# Patient Record
Sex: FEMALE | Race: WHITE
[De-identification: names, ages, dates, MRNs, and addresses within clinical notes are randomized per-mention and may not be internally consistent; named-entity substitution may affect disease eponyms.]

---

## 2017-06-06 ENCOUNTER — HOSPITAL ENCOUNTER (OUTPATIENT)
Dept: HOSPITAL 62 - SP | Age: 72
End: 2017-06-06
Attending: OPHTHALMOLOGY
Payer: MEDICARE

## 2017-06-06 DIAGNOSIS — G45.3: Primary | ICD-10-CM

## 2017-06-06 PROCEDURE — 93880 EXTRACRANIAL BILAT STUDY: CPT

## 2017-06-06 NOTE — RADIOLOGY REPORT (SQ)
EXAM DESCRIPTION:  CAROTID DOPPLER



COMPLETED DATE/TIME:  6/6/2017 11:53 am



REASON FOR STUDY:  AMAUROSIS FUGAX G45.3  AMAUROSIS FUGAX



COMPARISON:  No prior brain imaging available



TECHNIQUE:  Grayscale ultrasound, Doppler velocity and spectra, and color Doppler images acquired of 
the extra-cranial carotid and vertebral arteries. Images stored on PACS.



LIMITATIONS:  None.



FINDINGS:  RIGHT CAROTID

CCA Velocities: Within normal limits.

ICA Velocities

 Peak systolic 0.60 m/s.

 End diastolic 0.16 m/s.

Proximal ICA/CCA peak systolic ratio 1.5.

Spectra normal. No significant plaque.

LEFT CAROTID

CCA Velocities: Within normal limits.

ICA Velocities

 Peak systolic 0.72 m/s.

 End diastolic 0.14 m/s.

Proximal ICA/CCA peak systolic ratio 1.1.

Spectra normal. No significant plaque.

VERTEBRAL ARTERIES: Antegrade flow.  Normal waveforms.

SUBCLAVIAN ARTERIES: Not evaluated

OTHER: No other significant finding.



IMPRESSION:  NO HEMODYNAMICALLY SIGNIFICANT STENOSIS.



COMMENT:  Quality ID #195:  Velocity criteria are extrapolated from the diameter data as defined by t
he Society of Radiologists in Ultrasound Consensus Conference. Radiology 2003: 229; 340-346.



TECHNICAL DOCUMENTATION:  JOB ID:  [id number]

 2011 [employer]- All Rights Reserved

## 2018-02-15 ENCOUNTER — HOSPITAL ENCOUNTER (OUTPATIENT)
Dept: HOSPITAL 62 - SC | Age: 73
Discharge: HOME | End: 2018-02-15
Attending: OPHTHALMOLOGY
Payer: MEDICARE

## 2018-02-15 DIAGNOSIS — M19.90: ICD-10-CM

## 2018-02-15 DIAGNOSIS — G45.3: ICD-10-CM

## 2018-02-15 DIAGNOSIS — E78.00: ICD-10-CM

## 2018-02-15 DIAGNOSIS — D31.31: ICD-10-CM

## 2018-02-15 DIAGNOSIS — H25.813: Primary | ICD-10-CM

## 2018-02-15 DIAGNOSIS — H43.811: ICD-10-CM

## 2018-02-15 DIAGNOSIS — H01.002: ICD-10-CM

## 2018-02-15 DIAGNOSIS — I10: ICD-10-CM

## 2018-02-15 DIAGNOSIS — H01.005: ICD-10-CM

## 2018-02-15 DIAGNOSIS — H04.123: ICD-10-CM

## 2018-02-15 DIAGNOSIS — Z87.440: ICD-10-CM

## 2018-02-15 DIAGNOSIS — Z79.899: ICD-10-CM

## 2018-02-15 PROCEDURE — 08RK3JZ REPLACEMENT OF LEFT LENS WITH SYNTHETIC SUBSTITUTE, PERCUTANEOUS APPROACH: ICD-10-PCS | Performed by: OPHTHALMOLOGY

## 2018-02-15 PROCEDURE — 66984 XCAPSL CTRC RMVL W/O ECP: CPT

## 2018-02-15 PROCEDURE — V2632 POST CHMBR INTRAOCULAR LENS: HCPCS

## 2018-02-15 RX ADMIN — CYCLOPENTOLATE HYDROCHLORIDE AND PHENYLEPHRINE HYDROCHLORIDE PRN DROP: 2; 10 SOLUTION/ DROPS OPHTHALMIC at 10:13

## 2018-02-15 RX ADMIN — TROPICAMIDE PRN DROP: 10 SOLUTION/ DROPS OPHTHALMIC at 10:23

## 2018-02-15 RX ADMIN — TROPICAMIDE PRN DROP: 10 SOLUTION/ DROPS OPHTHALMIC at 10:06

## 2018-02-15 RX ADMIN — CYCLOPENTOLATE HYDROCHLORIDE AND PHENYLEPHRINE HYDROCHLORIDE PRN DROP: 2; 10 SOLUTION/ DROPS OPHTHALMIC at 10:06

## 2018-02-15 RX ADMIN — TETRACAINE HYDROCHLORIDE PRN DROP: 5 SOLUTION OPHTHALMIC at 10:38

## 2018-02-15 RX ADMIN — BESIFLOXACIN PRN DROP: 6 SUSPENSION OPHTHALMIC at 10:13

## 2018-02-15 RX ADMIN — TETRACAINE HYDROCHLORIDE PRN DROP: 5 SOLUTION OPHTHALMIC at 10:43

## 2018-02-15 RX ADMIN — BESIFLOXACIN PRN DROP: 6 SUSPENSION OPHTHALMIC at 11:03

## 2018-02-15 RX ADMIN — TETRACAINE HYDROCHLORIDE PRN DROP: 5 SOLUTION OPHTHALMIC at 10:06

## 2018-02-15 RX ADMIN — BESIFLOXACIN PRN DROP: 6 SUSPENSION OPHTHALMIC at 10:06

## 2018-02-15 RX ADMIN — CYCLOPENTOLATE HYDROCHLORIDE AND PHENYLEPHRINE HYDROCHLORIDE PRN DROP: 2; 10 SOLUTION/ DROPS OPHTHALMIC at 10:23

## 2018-02-15 RX ADMIN — TROPICAMIDE PRN DROP: 10 SOLUTION/ DROPS OPHTHALMIC at 10:13

## 2018-02-15 NOTE — [facility] OPERATIVE REPORT E
[facility] Operative Report



NAME: YAYA ALLEN

MRN: [id number]

AGE: 72Y

DATE OF SURGERY: 02/15/2018



PREOPERATIVE DIAGNOSIS:

CATARACT, LEFT EYE.



POSTOPERATIVE DIAGNOSIS:

CATARACT, LEFT EYE.



OPERATION:

Cataract extraction with intraocular lens implant of the left eye.



SURGEON:

SARA CALL M.D.



ANESTHESIA:

Topical.



PROCEDURE:

After obtaining appropriate consent, the patient's left eye was prepped and

draped in sterile fashion as well as the surgeon in a sterile manner, and

cataract surgery was started.  First a paracentesis blade was used to make

a small side-port incision.  Viscoelastic was used to inflate the anterior

chamber.  Next a 2.4 mm incision was made with the paracentesis blade.  A

continuous capsulorrhexis incision was made using a cystotome and Utrata

forceps.  Following this hydrodissection was carried out to make the lens

fully loose and mobile and it was rotated 90 degrees.  Following this, a

divide-and-conquer technique was used to phacoemulsify the lens with a CDE

of 5.23. The remaining cortex was removed with irrigation/aspiration. 

Provisc was instilled into the capsular bag to inflate the bag.  A SN60WF,

12.5 diopter lens was placed.  The remaining viscoelastic material was

removed with irrigation/aspiration.  Following this, a 10-0 nylon suture

was used to close the incision and it was found to be watertight.  Vigamox

was instilled in the eye and a protective shield was placed over the eye. 

The patient returned to the postoperative recovery in stable condition.





DICTATING PHYSICIAN: SARA CALL M.D.



5139M              DT: 02/15/2018 2227

PHY#: 2011         DD: 02/15/2018 1913

ID:   [id number]               JOB#: [id number]       ACCT: [id number]



cc:SARA CALL M.D.

>

## 2018-02-16 NOTE — DISCHARGE SUMMARY E
Discharge Summary



NAME: YAYA ALLEN

MRN:  [id number]        : 1945     AGE: 72Y

ADMITTED: 02/15/2018                  DISCHARGED: 02/15/2018



This is a 72-year-old patient who underwent cataract extraction of her

left eye.



DIAGNOSIS:  Cataract, left eye.



She underwent surgery because she was having difficulty driving at night

secondary to glare from headlights.



She should be on a regular diet.  No bending at the waist.  No heavy

lifting.  She should use her Besivance, Ilevro, and Durezol at 2:00 p.m.

and 8:00 p.m. and sleep with a rigid shield.  I will see her for her 1-day

postoperative tomorrow.







DICTATING PHYSICIAN:  SARA CALL M.D.





5139M                  DT: 02/15/2018    2244

PHY#: 2011            DD: 02/15/2018    1913

ID:   [id number]           JOB#: [id number]       ACCT: [id number]



cc:SARA CALL M.D.

>

## 2018-03-08 ENCOUNTER — HOSPITAL ENCOUNTER (OUTPATIENT)
Dept: HOSPITAL 62 - SC | Age: 73
Discharge: HOME | End: 2018-03-08
Attending: OPHTHALMOLOGY
Payer: MEDICARE

## 2018-03-08 DIAGNOSIS — H25.811: Primary | ICD-10-CM

## 2018-03-08 DIAGNOSIS — Z96.1: ICD-10-CM

## 2018-03-08 DIAGNOSIS — K21.9: ICD-10-CM

## 2018-03-08 DIAGNOSIS — E66.9: ICD-10-CM

## 2018-03-08 DIAGNOSIS — Z79.899: ICD-10-CM

## 2018-03-08 DIAGNOSIS — I10: ICD-10-CM

## 2018-03-08 DIAGNOSIS — Z79.51: ICD-10-CM

## 2018-03-08 PROCEDURE — 08RJ3JZ REPLACEMENT OF RIGHT LENS WITH SYNTHETIC SUBSTITUTE, PERCUTANEOUS APPROACH: ICD-10-PCS | Performed by: OPHTHALMOLOGY

## 2018-03-08 PROCEDURE — 66984 XCAPSL CTRC RMVL W/O ECP: CPT

## 2018-03-08 PROCEDURE — V2632 POST CHMBR INTRAOCULAR LENS: HCPCS

## 2018-03-08 RX ADMIN — CYCLOPENTOLATE HYDROCHLORIDE AND PHENYLEPHRINE HYDROCHLORIDE PRN DROP: 2; 10 SOLUTION/ DROPS OPHTHALMIC at 09:54

## 2018-03-08 RX ADMIN — CYCLOPENTOLATE HYDROCHLORIDE AND PHENYLEPHRINE HYDROCHLORIDE PRN DROP: 2; 10 SOLUTION/ DROPS OPHTHALMIC at 10:04

## 2018-03-08 RX ADMIN — CYCLOPENTOLATE HYDROCHLORIDE AND PHENYLEPHRINE HYDROCHLORIDE PRN DROP: 2; 10 SOLUTION/ DROPS OPHTHALMIC at 10:13

## 2018-03-08 RX ADMIN — TROPICAMIDE PRN DROP: 10 SOLUTION/ DROPS OPHTHALMIC at 09:54

## 2018-03-08 RX ADMIN — TETRACAINE HYDROCHLORIDE PRN DROP: 5 SOLUTION OPHTHALMIC at 09:53

## 2018-03-08 RX ADMIN — TETRACAINE HYDROCHLORIDE PRN DROP: 5 SOLUTION OPHTHALMIC at 10:28

## 2018-03-08 RX ADMIN — TETRACAINE HYDROCHLORIDE PRN DROP: 5 SOLUTION OPHTHALMIC at 10:18

## 2018-03-08 RX ADMIN — BESIFLOXACIN PRN DROP: 6 SUSPENSION OPHTHALMIC at 10:14

## 2018-03-08 RX ADMIN — TROPICAMIDE PRN DROP: 10 SOLUTION/ DROPS OPHTHALMIC at 10:13

## 2018-03-08 RX ADMIN — BESIFLOXACIN PRN DROP: 6 SUSPENSION OPHTHALMIC at 10:50

## 2018-03-08 RX ADMIN — BESIFLOXACIN PRN DROP: 6 SUSPENSION OPHTHALMIC at 09:55

## 2018-03-08 RX ADMIN — TROPICAMIDE PRN DROP: 10 SOLUTION/ DROPS OPHTHALMIC at 10:04

## 2018-03-08 NOTE — [facility] OPERATIVE REPORT E
[facility] Operative Report



NAME: YAYA ALLEN

                                      MRN: [id number]

                             AGE: 72Y

DATE OF SURGERY:  03/08/2018        ROOM:



PREOPERATIVE DIAGNOSIS:

CATARACT, RIGHT EYE.



POSTOPERATIVE DIAGNOSIS:

CATARACT, RIGHT EYE.



OPERATION:

Cataract extraction with intraocular lens implant of the right eye.



SURGEON:

SARA CALL M.D.



ANESTHESIA:

Topical.



PROCEDURE:

After obtaining appropriate consent, the patient's right eye was prepped

and draped in sterile fashion as well as the surgeon in a sterile manner

and cataract surgery was started.  First a paracentesis blade was used to

make a small side-port incision.  Viscoelastic was used to inflate the

anterior chamber.  Next a 2.4 mm incision was made with the paracentesis

blade.  A continuous capsulorrhexis incision was made using a cystotome and

Utrata forceps.  Following this hydrodissection was carried out to make the

lens fully loose and mobile and it was rotated 90 degrees.  Following this,

a divide-and-conquer technique was used to phacoemulsify the lens with a

CDE of 5.84. The remaining cortex was removed with irrigation/aspiration. 

Provisc was instilled into the capsular bag to inflate the bag.  A SN60WF,

15.0 diopter lens was placed.  The remaining viscoelastic material was

removed with irrigation/aspiration.  Following this, a 10-0 nylon suture

was used to close the incision and it was found to be watertight.  Vigamox

was instilled in the eye and a protective shield was placed over the eye. 

The patient returned to the postoperative recovery in stable condition.







DICTATING PHYSICIAN: SARA CALL M.D.



5090M              DT: 03/08/2018 2012

PHY#: 2011         DD: 03/08/2018 2010

ID:   [id number]               JOB#: [id number]       ACCT: [id number]



cc:SARA CALL M.D.

>

## 2018-03-08 NOTE — DISCHARGE SUMMARY E
Discharge Summary



NAME: YAYA ALLEN

MRN:  [id number]        : 1945     AGE: 72Y

ADMITTED: 2018                  DISCHARGED: 2018



HOSPITAL COURSE:

This is a 72-year-old-old female who underwent cataract extraction of the

right eye.



DIAGNOSIS:

Cataract, right eye.



She underwent surgery because she was having difficulty reading small

print like newspaper.



DISCHARGE INSTRUCTIONS:

She is to be on a regular diet.  No bending at her waist, no heavy

lifting.   She is to use her Besivance, Ilevro, and Durezol at 3:00 p.m.

and 8:00 p.m., and sleep with a rigid shield.  I will see her for her 1

day postoperative tomorrow.









DICTATING PHYSICIAN:  SARA CALL M.D.





5090M                  DT: 2018

PHY#: 2011            DD: 2018

ID:   [id number]           JOB#: [id number]      ACCT: [id number]



cc:SARA CALL M.D.

>

## 2019-09-03 ENCOUNTER — HOSPITAL ENCOUNTER (OUTPATIENT)
Dept: HOSPITAL 62 - WI | Age: 74
End: 2019-09-03
Attending: NURSE PRACTITIONER
Payer: MEDICARE

## 2019-09-03 DIAGNOSIS — M85.88: ICD-10-CM

## 2019-09-03 DIAGNOSIS — Z12.31: Primary | ICD-10-CM

## 2019-09-03 DIAGNOSIS — N95.8: ICD-10-CM

## 2019-09-03 PROCEDURE — 77080 DXA BONE DENSITY AXIAL: CPT

## 2019-09-03 PROCEDURE — 77067 SCR MAMMO BI INCL CAD: CPT

## 2019-09-03 NOTE — WOMENS IMAGING REPORT
EXAM DESCRIPTION:  BONE DENSITY HIP/SPINE



COMPLETED DATE/TIME:  9/3/2019 2:23 pm



REASON FOR STUDY:  N95.8 OTHER MENOPAUSAL DISORDER Z12.31  ENCNTR SCREEN MAMMOGRAM FOR MALIGNANT NEOP
LASM OF DIANA N95.8  OTHER SPECIFIED MENOPAUSAL AND PERIMENOPAUSAL DISORDER



COMPARISON:   2003



TECHNIQUE:  Dual-Energy X-ray Absorptiometry (DEXA) of the AP Spine and Hip.



LIMITATIONS:  None.



FINDINGS:  LUMBAR SPINE:

The bone mineral density (BMD) measured from L1-L4 in the AP projection correlates with a T-score of 
+0.1, which is normal as defined by the World Health Organization.

BMD Change vs Baseline:  N/A

HIP:

The bone mineral density (BMD) measured in the left total hip correlates with a T-score of -1.1, whic
h is osteopenic as defined by the World Health Organization.

BMD Change vs Baseline:  This represents a 23% decrease in bone density since 2003

10 year Fracture Risk Assessment:

Major Osteoporotic Fracture:  12%

Hip Fracture:  3.2%



IMPRESSION:  1. LUMBAR SPINE WHO CLASSIFICATION: Normal

2. HIP WHO CLASSIFICATION: Osteopenic

OVERALL ASSESSMENT:

WHO CLASSIFICATION:  Osteopenic



COMMENT:  The World Health Organization defines low BMD as follows:

T-score:

Normal:  Greater than -1.0

Osteopenia: Between -1.0 and -2.5

Osteoporosis:  Less than -2.5 without fractures

Established osteoporosis:  Less than -2.5 with fractures

In general, you may wish to consider:

Diagnosis          Treatment                     Follow-up DEXA

Normal BMD      Prevention                    2-3 years

Osteopenia       Prevention/Therapy        1-2 years

Osteoporosis     Therapy                        Yearly



TECHNICAL DOCUMENTATION:  JOB ID:  [id number]

 2011 [employer]- All Rights Reserved



Reading location - IP/workstation name: [email or web address]

## 2019-09-03 NOTE — WOMENS IMAGING REPORT
EXAM DESCRIPTION:  BILAT SCREENING MAMMO W/CAD



COMPLETED DATE/TIME:  9/3/2019 2:23 pm



REASON FOR STUDY:  12.31 SCREENING MAMMO Z12.31  ENCNTR SCREEN MAMMOGRAM FOR MALIGNANT NEOPLASM OF BR
E N95.8  OTHER SPECIFIED MENOPAUSAL AND PERIMENOPAUSAL DISORDER



COMPARISON:  2010



EXAM PARAMETERS:  Standard craniocaudal and mediolateral oblique views of each breast recorded using 
digital acquisition.

Read with the assistance of CAD.

.[facility] [employer]  Version 9.2



LIMITATIONS:  None.



FINDINGS:  No suspicious masses, suspicious calcifications or architectural distortion. No areas of c
oncern.



IMPRESSION:  Negative MAMMOGRAM.  BIRADS 1



BREAST DENSITY:  a. The breasts are almost entirely fatty.



BIRAD:  ASSESSMENT:  1 NEGATIVE



RECOMMENDATION:  ROUTINE SCREENING



COMMENT:  The patient has been notified of the results by letter per MQSA requirements. Additional no
tification policies are in place for contacting patient with suspicious or incomplete findings.

Quality ID #225: The American College of Radiology recommends an annual screening mammogram for women
 aged 40 years or over. This facility utilizes a reminder system to ensure that all patients receive 
reminder letters, and/or direct phone calls for appointments. This includes reminders for routine scr
eening mammograms, diagnostic mammograms, or other Breast Imaging Interventions when appropriate.  Th
is patient will be placed in the appropriate reminder system.



TECHNICAL DOCUMENTATION:  FINDING NUMBER: (1)

ASSESSMENT: (1)

JOB ID:  [id number]

 2011 [employer]- All Rights Reserved



Reading location - IP/workstation name: [email or web address]

## 2020-07-13 ENCOUNTER — HOSPITAL ENCOUNTER (OUTPATIENT)
Dept: HOSPITAL 62 - RAD | Age: 75
End: 2020-07-13
Attending: INTERNAL MEDICINE
Payer: MEDICARE

## 2020-07-13 DIAGNOSIS — E78.00: ICD-10-CM

## 2020-07-13 DIAGNOSIS — R07.9: Primary | ICD-10-CM

## 2020-07-13 DIAGNOSIS — I10: ICD-10-CM

## 2020-07-13 DIAGNOSIS — R01.1: ICD-10-CM

## 2020-07-13 DIAGNOSIS — R06.02: ICD-10-CM

## 2020-07-13 PROCEDURE — 78452 HT MUSCLE IMAGE SPECT MULT: CPT

## 2020-07-13 PROCEDURE — 93306 TTE W/DOPPLER COMPLETE: CPT

## 2020-07-13 PROCEDURE — A9500 TC99M SESTAMIBI: HCPCS

## 2020-07-13 PROCEDURE — 93017 CV STRESS TEST TRACING ONLY: CPT

## 2020-07-13 NOTE — XCELERA REPORT
[address] [address]

                               Tel: [phone]

                               Fax: [phone]



                      Transthoracic Echocardiogram Report

_______________________________________________________________________________



Name: YAYA ALLEN

MRN: [id number]                                Age: 75 yrs

Gender: Female                                 : 1945

Patient Status: Outpatient                     Patient Location: RAD

Account #: [id number]

Study Date: 2020 11:30 AM

History: Dyspnea

Accession #: [id number]

_______________________________________________________________________________



Height: 62 in        Weight: 232 lb        BSA: 2.0 m2

_______________________________________________________________________________

Procedure: A complete two-dimensional transthoracic echocardiogram was

performed (2D, M-mode, spectral and color flow Doppler). The study was

technically difficult with many images being suboptimal in quality.

Reason For Study: SOB CP

Previous Evaluation: No previous studies were available.

History: Shortness of breath. Chest pain.



Ordering Physician: JUAN SMITH

Performed By: Rashmi Armstrong

_______________________________________________________________________________



Interpretation Summary

Left ventricular systolic function is normal.

The Ejection Fraction estimate is 60-65%

The right ventricle is normal in size and function.

There is a mild amount of mitral regurgitation

There is mild aortic stenosis

There is a trace amount of aortic regurgitation

There is no pericardial effusion.



MMode/2D Measurements & Calculations



RVDd: 3.1 cm        LVIDd: 4.3 cm FS: 38.0 %           Ao root diam: 2.4 cm

IVSd: 1.3 cm        LVIDs: 2.7 cm EDV(Teich): 83.1 ml  Ao root area: 4.6 cm2

                    LVPWd: 1.3 cm ESV(Teich): 26.2 ml  LA dimension: 4.5 cm

                                  EF(Teich): 68.5 %

        _______________________________________________________________

LVOT diam: 1.8 cm



LVOT area: 2.6 cm2



Doppler Measurements & Calculations

MV E max claudia:      MV P1/2t max claudia:     Ao V2 max:        LV V1 max P.4 cm/sec       142.4 cm/sec          221.5 cm/sec      10.2 mmHg

MV A max claudia:      MV P1/2t: 96.8 msec   Ao max PG:        LV V1 mean P.0 cm/sec       MVA(P1/2t): 2.3 cm2   19.6 mmHg         6.2 mmHg

MV E/A: 1.00       MV dec slope:         Ao V2 mean:       LV V1 max:

                                         136.9 cm/sec      159.6 cm/sec

                   431.0 cm/sec2         Ao mean PG:       LV V1 mean:

                   MV dec time: 0.32 sec 9.1 mmHg          118.3 cm/sec

                                         Ao V2 VTI: 45.3 cmLV V1 VTI: 38.7 cm

                                         ALVARO(I,D): 2.2 cm2



                                         ALVARO(V,D): 1.9 cm2

        _______________________________________________________________

SV(LVOT): 101.5 ml PA V2 max:            TR max claudia:       MV P1/2t-pr_phl:

                   113.0 cm/sec          357.0 cm/sec      96.8 msec

                   PA max P.1 mmHg   TR max P.0 mmHg





Left Ventricle

The left ventricle is normal in size. There is moderate concentric left

ventricular hypertrophy. Left ventricular systolic function is normal. The

Ejection Fraction estimate is 60-65%. Doppler measurements suggest

pseudonormalized left ventricular relaxation, which is associated with grade

II/IV or mild to moderate diastolic dysfunction.



Right Ventricle

The right ventricle is normal in size and function.



Atria

The right atrium is normal. The left atrium is moderately dilated. The

interatrial septum is intact with no evidence for an atrial septal defect.

There is no Doppler evidence for an interatrial shunt.



Mitral Valve

Calcified mitral apparatus. There is no mitral valve stenosis. There is a mild

amount of mitral regurgitation.





Aortic Valve

The aortic valve is trileaflet. The aortic valve is calcified. There is mild

aortic stenosis. There is a trace amount of aortic regurgitation.



Tricuspid Valve

The tricuspid valve is normal in structure and function. There is a trace

amount of tricuspid regurgitation. Right ventricular systolic pressure is

estimated to be elevated at 50-60mmHg. There is mild to moderate pulmonary

hypertension by echo.



Pulmonic Valve

The pulmonic valve is not well visualized. There is a trace amount of pulmonic

regurgitation.



Great Vessels

The aortic root is normal size. The inferior vena cava appeared normal and

decreased > 50% with respiration (RAP 5-10 mmHg).



Effusions

There is no pericardial effusion.





_______________________________________________________________________________

_______________________________________________________________________________



Electronically signed by:      Juan Smith      on 2020 04:18 PM



CC: JUAN SMITH Anil

## 2020-07-13 NOTE — DRAGON STRESS TEST REPORT
Pharmacological nuclear stress test



Date: 7/13/2020

Referring physician: Shade Joe MD

Performing physician: Shade Joe MD

Indication: Chest pain



Clinical history

75 year-old lady with medical history significant for systemic hypertension, 
dyslipidemia presenting with chest pain.  We decided to proceed with 
pharmacological nuclear stress test.



Procedure

The patient presented to the stress lab.  Initially rest images were obtained 
according to standard protocol after the injection of 14.2 millicurie technetium
99m sestamibi.  Subsequently the patient underwent pharmacological stress 
utilizing 0.4 mg of regadenoson intravenously.  The patient's EKG and vital 
signs were monitored throughout the procedure.  Subsequently patient was 
injected with 43.9 millicuries of technetium 99m sestamibi.  After a period of 
rest, stress images were obtained according to standard protocol.



EKG showed sinus rhythm 67 at beats per minute.  The patient's stress EKG did 
not show any evidence for myocardial ischemia.  There were no arrhythmias 
observed.



Raw as well as processed rest and stress images were reviewed.  There was mild 
to moderate gut uptake which did not interfere with the study.  The rest and 
stress images show uniform uptake of radioactive isotope without any fixed or 
reversible defects.  There is evidence of shifting breast attenuation.  There is
normal contractility post-rest.  The calculated ejection fraction is 60 %.  The 
TID ratio is 1.07.



Conclusion

The stress EKG is negative for myocardial ischemia  

There is no convincing evidence of myocardial ischemia or myocardial infarction 
provoked by pharmacological stress.

There is evidence of shifting breast attenuation

There is normal contractility post-stress.

The gated left ventricular ejection fraction is 60 %.

The patient will be given an appointment to discuss these results.







[facility]